# Patient Record
(demographics unavailable — no encounter records)

---

## 2018-01-14 NOTE — MISCELLANEOUS
Message  GI Reminder Recall Mercedes Chirinos:   Date: 04/15/2016   Dear Gael Valle:     Review of our records shows you are due for the following: EGD and Colonoscopy  Please call the following office to schedule your appointment:     We look forward to hearing from you! Sincerely,         Signatures   Electronically signed by :  Eleno Sher, ; Apr 15 2016 12:29PM EST                       (Author)